# Patient Record
Sex: MALE | Race: WHITE
[De-identification: names, ages, dates, MRNs, and addresses within clinical notes are randomized per-mention and may not be internally consistent; named-entity substitution may affect disease eponyms.]

---

## 2023-05-20 ENCOUNTER — HOSPITAL ENCOUNTER (EMERGENCY)
Dept: HOSPITAL 33 - ED | Age: 48
Discharge: HOME | End: 2023-05-20
Payer: COMMERCIAL

## 2023-05-20 VITALS — SYSTOLIC BLOOD PRESSURE: 128 MMHG | HEART RATE: 68 BPM | DIASTOLIC BLOOD PRESSURE: 80 MMHG

## 2023-05-20 VITALS — OXYGEN SATURATION: 99 %

## 2023-05-20 DIAGNOSIS — Z79.899: ICD-10-CM

## 2023-05-20 DIAGNOSIS — S39.012A: Primary | ICD-10-CM

## 2023-05-20 DIAGNOSIS — Z72.0: ICD-10-CM

## 2023-05-20 DIAGNOSIS — Z79.52: ICD-10-CM

## 2023-05-20 PROCEDURE — 96372 THER/PROPH/DIAG INJ SC/IM: CPT

## 2023-05-20 PROCEDURE — 99283 EMERGENCY DEPT VISIT LOW MDM: CPT

## 2023-05-20 NOTE — ERPHSYRPT
- History of Present Illness


Time Seen by Provider: 05/20/23 10:31


Source: patient


Exam Limitations: no limitations


Patient Subjective Stated Complaint: Pt states "I am from [place], I have a 

couple of disks out and I need a toradol shot and maybe some steroids. I have an

appointment with Dr. Cortes on the 23 rd but that is a ways off."


Triage Nursing Assessment: PT presented alert and oriented X 3, skin pwd. Pt 

ambulates with an upright steady gait, able to speak in clear full sentences pt 

does not want to sit down.


Physician History: 





47-year-old male with history of chronic back pain with discectomy in the past 

presented in the ER with increasing low back pain more on the left sacroiliac 

area.  No numbness tingling or weakness of lower extremities, no loss of bowel 

or bladder control.  Patient follows up with pain management here.  Reports 

having similar symptoms multiple times in the past that get better with 

Toradol/Medrol Dosepak.  Denies any fall or trauma.


Timing/Duration: day(s), gradual onset, worse


Method of Injury: unknown


Quality: sharp


Back Pain Radiation: buttocks


Severity of Pain-Max: severe


Severity of Pain-Current: severe


Modifying Factors: Improves With: immobilization.  Worsens With: movement


Associated Symptoms: lower back pain, muscle spasms, No fever, No chills, No 

sweating, No urinary incontinence, No loss of bowel control, No constipation, No

nausea, No vomiting, No problems urinating, No light-headedness, No dizziness, 

No numbness in legs/feet, No weakness, No sensory/motor loss, No tingling in 

legs/feet


Previous symptoms: same symptoms as today


Allergies/Adverse Reactions: 








No Known Drug Allergies Allergy (Verified 05/20/23 10:28)


   





Home Medications: 








Gabapentin [Neurontin] 400 mg PO DAILY 05/20/23 [History]





Hx Tetanus, Diphtheria Vaccination/Date Given: Yes


Hx Influenza Vaccination/Date Given: No


Hx Pneumococcal Vaccination/Date Given: No


Immunizations Up to Date: Yes





Travel Risk





- International Travel


Have you traveled outside of the country in past 3 weeks: No





- Coronavirus Screening


Are you exhibiting any of the following symptoms?: No


Close contact with a COVID-19 positive Pt in past 14-21 Days: No





- Vaccine Status


Have you recieved a Covid-19 vaccination: Yes


: [employer]





- Review of Systems


Constitutional: No Symptoms


Ears, Nose, & Throat: No Symptoms


Respiratory: No Symptoms


Cardiac: No Symptoms


Abdominal/Gastrointestinal: No Symptoms


Genitourinary Symptoms: No Symptoms


Musculoskeletal: Back Pain


Skin: No Symptoms


Neurological: No Symptoms


Endocrine: No Symptoms


Immunological/Allergic: No Symptoms





- Past Medical History


Pertinent Past Medical History: Yes


Neurological History: No Pertinent History


ENT History: No Pertinent History


Cardiac History: No Pertinent History


Respiratory History: No Pertinent History


Endocrine Medical History: No Pertinent History


Musculoskeletal History: No Pertinent History


GI Medical History: No Pertinent History


 History: No Pertinent History


Psycho-Social History: Anxiety, Depression


Male Reproductive Disorders: No Pertinent History


Other Medical History: nerve pain





- Past Surgical History


Past Surgical History: Yes


Other Surgical History: laminectomy.  l5 discectomy





- Social History


Smoking Status: Current every day smoker


How long have you smoked: years


Exposure to second hand smoke: Yes


Drug Use: marijuana


Patient Lives Alone: No





- Nursing Vital Signs


Nursing Vital Signs: 


                               Initial Vital Signs











Temperature  97.3 F   05/20/23 10:22


 


Pulse Rate  75   05/20/23 10:22


 


Respiratory Rate  20   05/20/23 10:22


 


Blood Pressure  134/91   05/20/23 10:22


 


O2 Sat by Pulse Oximetry  99   05/20/23 10:22








                                   Pain Scale











Pain Intensity                 4

















- Physical Exam


General Appearance: no apparent distress, alert, anxiety


Eye Exam: PERRL/EOMI


Ears, Nose, Throat Exam: normal ENT inspection


Neck Exam: normal inspection, full range of motion


Respiratory Exam: normal breath sounds, lungs clear


Cardiovascular Exam: regular rate/rhythm, normal peripheral pulses


Gastrointestinal Exam: soft, normal bowel sounds, No tenderness


Back Exam: normal inspection, normal range of motion, CVA tenderness, vertebral 

tenderness (Minimal lower lumbar), decreased range of motion, muscle spasm 

(Bilateral lumbar paraspinal), point tenderness (Left sacroiliac area)


Extremity Exam: normal inspection, normal range of motion, pelvis stable


Neurologic Exam: alert, oriented x 3, cooperative, CNs II-XII nml as tested, 

normal mood/affect, nml cerebellar function, nml station & gait, sensation nml, 

No motor deficits


Skin Exam: normal color


**SpO2 Interpretation**: normal


SpO2: 99


O2 Delivery: Room Air


Ordered Tests: 


Medication Summary














Discontinued Medications














Generic Name Dose Route Start Last Admin





  Trade Name Freq  PRN Reason Stop Dose Admin


 


Ketorolac Tromethamine  60 mg  05/20/23 10:37  05/20/23 10:38





  Ketorolac Tromethamine 30 Mg/Ml Inj  IM  05/20/23 10:38  60 mg





  STAT ONE   Administration


 


Ketorolac Tromethamine  Confirm  05/20/23 10:37 





  Ketorolac Tromethamine 30 Mg/Ml Inj  Administered  05/20/23 10:38 





  Dose  





  60 mg  





  .ROUTE  





  .STK-MED ONE  


 


Orphenadrine Citrate  60 mg  05/20/23 11:31  05/20/23 11:33





  Orphenadrine Citrate 60 Mg/2 Ml Vial  IM  05/20/23 11:32  Not Given





  STAT ONE  














- Progress


Progress: improved, pain not gone completely


Progress Note: 





05/20/23 11:35


47-year-old with chronic back pain including previous surgeries is evaluated for

 worsening low back pain especially in the left sacroiliac area without any 

cauda equina symptoms.  Has negative neuro exam and lower extremities.  I 

believe patient has some spasms does not have any definite room midline 

tenderness but more on the sacroiliac area.  Is given Toradol, offered muscle 

relaxants which he refused.  Patient is feeling better on reevaluation but pain 

is not completely resolved.  He is ambulating in the ER without any limitations.

  He is advised to keep his appointment with pain management.  We will give a 

prescription of Medrol Dosepak.  Discussed signs symptoms of worsening needing 

return to ER which she seems understanding.  Stable for discharge.


Counseled pt/family regarding: diagnosis, need for follow-up





Medical Desision Making





- Diagnostic Testing


Diagnostic test were ordered, analyzed, and reviewed by me: No





- Risk of complications


The pt has a mod risk of morbidity or mortality based on: Need for prescription 

drug management





- Departure


Departure Disposition: Home


Clinical Impression: 


 Strain of muscle, fascia and tendon of lower back, initial encounter





Condition: Stable


Critical Care Time: No


Referrals: 


DOCTOR,NO FAMILY [Primary Care Provider] - Follow up/PCP as directed


Instructions:  Low Back Pain  (DC)


Additional Instructions: 


Follow-up with your primary care and pain management for reevaluation.


Take Tylenol/ibuprofen as needed. 


 Return to ER for intractable pain, numbness tingling weakness of lower 

extremities/loss of bowel or bladder control/perineal numbness etc.


Prescriptions: 


Methylprednisolone Packet*** [Medrol Dosepack***] 4 mg PO UD #30 packet